# Patient Record
(demographics unavailable — no encounter records)

---

## 2025-05-19 NOTE — DISCUSSION/SUMMARY
[de-identified] : (Impression) -right knee osteoarthritis, medial meniscus tear  The diagnosis was explained in detail. The potential non-surgical and surgical treatments were reviewed. The relative risks and benefits of each option were considered relative to the patient age, activity level, medical history, symptom severity and previously attempted treatments.  The patient was advised to consult with their primary medical provider prior to initiation of any new medications to reduce the risk of adverse effects specific to their long-term home medications and medical history. The risk of gastrointestinal irritation and kidney injury specific to long-term NSAID use was discussed.    (Plan)  -Physical therapy recommended for stretching and strengthening. -Cortisone injection performed for symptom relief. -Meloxicam for pain and inflammation, take as needed. -MRI is deferred at this time. -Discussed arthroscopy vs TKA based on MRI results if symptoms persist.  -Follow up in 6 to 8 weeks. If symptoms persist consider MRI prior to surgical options.      (MDM) Problem Complexity -Moderate: chronic illness with exacerbation   Risk -Moderate: injection   Visit Type -New: Patient has not been seen by another provider in my practice within the past 2 years who specializes in orthopedic surgery.  (Procedure: Corticosteroid Injection)   Injection location was the: -right knee joint   Injection contents were: 40 mg of kenalog and 5 mL of 1% lidocaine   Procedure Details: -Informed consent was obtained. Discussed possible risks of corticosteroid injection including hyperglycemia, infection and skin discoloration. -Discussed that due to infection risk, an interval between injection and any future surgery is 6 weeks for arthroscopy and 3 months for arthroplasty. -Injection performed using aseptic technique. Hemostasis was confirmed. -Procedure was tolerated well with no complications.

## 2025-05-19 NOTE — HISTORY OF PRESENT ILLNESS
[de-identified] : Date of initial evaluation: 05/19/2025 Patient age: 70 year Body part causing symptoms: right knee Location of the pain: anterior, posterior Pain score today: 7/10 Duration of symptoms: 1 year History of injury: none Activities that worsen the pain: walking, standing Radicular symptoms: none Medications attempted for this problem: Aleve Tylenol PT for this problem: none Injections for this problem: HA injection 04/2025 without relief, CSI 12/2024 Previous surgery on this body part: none Prior imaging: [facility] xray Occupation: currently not working

## 2025-05-19 NOTE — IMAGING
[Right] : right knee [All Views] : anteroposterior, lateral, skyline, and anteroposterior standing [Moderate tricompartmental OA medial narrowing] : Moderate tricompartmental OA medial narrowing [de-identified] : (Exam: Knee)   Laterality is right    Patient is in no acute distress, alert and oriented Sensation is grossly intact to light touch in the foot Motor function is 5/5 in the foot Capillary refill is less than 2 seconds in the toes Lymphadenopathy is not present Peripheral edema is not present   Skin is intact Effusion is trace Atrophy is not present Antalgic gait is present   Medial joint line tenderness is present Lateral joint line tenderness is present Patellar tenderness is present Calf tenderness is not present   Knee extension is 3 Knee flexion is 120   Quadriceps strength is 5/5 Hamstring strength is 5/5   Lachman is normal Posterior drawer is normal Varus stress stability is normal Valgus stress stability is normal   Medial Katharina test is abnormal Lateral Katharina test is abnormal Patellofemoral grind test is abnormal Patellar apprehension test is normal